# Patient Record
Sex: FEMALE | Race: WHITE | Employment: FULL TIME | URBAN - METROPOLITAN AREA
[De-identification: names, ages, dates, MRNs, and addresses within clinical notes are randomized per-mention and may not be internally consistent; named-entity substitution may affect disease eponyms.]

---

## 2020-09-22 ENCOUNTER — OFFICE VISIT (OUTPATIENT)
Dept: FAMILY MEDICINE CLINIC | Age: 50
End: 2020-09-22
Payer: COMMERCIAL

## 2020-09-22 VITALS
OXYGEN SATURATION: 99 % | DIASTOLIC BLOOD PRESSURE: 78 MMHG | RESPIRATION RATE: 16 BRPM | BODY MASS INDEX: 24.28 KG/M2 | HEART RATE: 63 BPM | SYSTOLIC BLOOD PRESSURE: 115 MMHG | TEMPERATURE: 97.2 F | WEIGHT: 142.2 LBS | HEIGHT: 64 IN

## 2020-09-22 DIAGNOSIS — N32.81 OVERACTIVE BLADDER: ICD-10-CM

## 2020-09-22 DIAGNOSIS — Z23 NEEDS FLU SHOT: ICD-10-CM

## 2020-09-22 DIAGNOSIS — Z13.1 SCREENING FOR DIABETES MELLITUS: ICD-10-CM

## 2020-09-22 DIAGNOSIS — Z13.6 SCREENING FOR CARDIOVASCULAR CONDITION: ICD-10-CM

## 2020-09-22 DIAGNOSIS — Z00.00 ANNUAL PHYSICAL EXAM: Primary | ICD-10-CM

## 2020-09-22 LAB
ALBUMIN SERPL-MCNC: 4.5 G/DL (ref 3.5–5)
ALBUMIN/GLOB SERPL: 1.4 {RATIO} (ref 1.1–2.2)
ALP SERPL-CCNC: 40 U/L (ref 45–117)
ALT SERPL-CCNC: 18 U/L (ref 12–78)
ANION GAP SERPL CALC-SCNC: 8 MMOL/L (ref 5–15)
APPEARANCE UR: CLEAR
AST SERPL-CCNC: 16 U/L (ref 15–37)
BASOPHILS # BLD: 0 K/UL (ref 0–0.1)
BASOPHILS NFR BLD: 1 % (ref 0–1)
BILIRUB SERPL-MCNC: 0.7 MG/DL (ref 0.2–1)
BILIRUB UR QL: NEGATIVE
BUN SERPL-MCNC: 12 MG/DL (ref 6–20)
BUN/CREAT SERPL: 14 (ref 12–20)
CALCIUM SERPL-MCNC: 9.4 MG/DL (ref 8.5–10.1)
CHLORIDE SERPL-SCNC: 109 MMOL/L (ref 97–108)
CHOLEST SERPL-MCNC: 217 MG/DL
CO2 SERPL-SCNC: 24 MMOL/L (ref 21–32)
COLOR UR: NORMAL
CREAT SERPL-MCNC: 0.86 MG/DL (ref 0.55–1.02)
DIFFERENTIAL METHOD BLD: ABNORMAL
EOSINOPHIL # BLD: 0.1 K/UL (ref 0–0.4)
EOSINOPHIL NFR BLD: 3 % (ref 0–7)
ERYTHROCYTE [DISTWIDTH] IN BLOOD BY AUTOMATED COUNT: 12.2 % (ref 11.5–14.5)
GLOBULIN SER CALC-MCNC: 3.2 G/DL (ref 2–4)
GLUCOSE SERPL-MCNC: 88 MG/DL (ref 65–100)
GLUCOSE UR STRIP.AUTO-MCNC: NEGATIVE MG/DL
HCT VFR BLD AUTO: 44.1 % (ref 35–47)
HDLC SERPL-MCNC: 82 MG/DL
HDLC SERPL: 2.6 {RATIO} (ref 0–5)
HGB BLD-MCNC: 14.2 G/DL (ref 11.5–16)
HGB UR QL STRIP: NEGATIVE
IMM GRANULOCYTES # BLD AUTO: 0 K/UL (ref 0–0.04)
IMM GRANULOCYTES NFR BLD AUTO: 1 % (ref 0–0.5)
KETONES UR QL STRIP.AUTO: NEGATIVE MG/DL
LDLC SERPL CALC-MCNC: 116.2 MG/DL (ref 0–100)
LEUKOCYTE ESTERASE UR QL STRIP.AUTO: NEGATIVE
LIPID PROFILE,FLP: ABNORMAL
LYMPHOCYTES # BLD: 1.1 K/UL (ref 0.8–3.5)
LYMPHOCYTES NFR BLD: 27 % (ref 12–49)
MCH RBC QN AUTO: 32.1 PG (ref 26–34)
MCHC RBC AUTO-ENTMCNC: 32.2 G/DL (ref 30–36.5)
MCV RBC AUTO: 99.8 FL (ref 80–99)
MONOCYTES # BLD: 0.4 K/UL (ref 0–1)
MONOCYTES NFR BLD: 9 % (ref 5–13)
NEUTS SEG # BLD: 2.4 K/UL (ref 1.8–8)
NEUTS SEG NFR BLD: 59 % (ref 32–75)
NITRITE UR QL STRIP.AUTO: NEGATIVE
NRBC # BLD: 0 K/UL (ref 0–0.01)
NRBC BLD-RTO: 0 PER 100 WBC
PH UR STRIP: 7 [PH] (ref 5–8)
PLATELET # BLD AUTO: 289 K/UL (ref 150–400)
PMV BLD AUTO: 10.5 FL (ref 8.9–12.9)
POTASSIUM SERPL-SCNC: 4.2 MMOL/L (ref 3.5–5.1)
PROT SERPL-MCNC: 7.7 G/DL (ref 6.4–8.2)
PROT UR STRIP-MCNC: NEGATIVE MG/DL
RBC # BLD AUTO: 4.42 M/UL (ref 3.8–5.2)
SODIUM SERPL-SCNC: 141 MMOL/L (ref 136–145)
SP GR UR REFRACTOMETRY: 1 (ref 1–1.03)
TRIGL SERPL-MCNC: 94 MG/DL (ref ?–150)
UROBILINOGEN UR QL STRIP.AUTO: 0.2 EU/DL (ref 0.2–1)
VLDLC SERPL CALC-MCNC: 18.8 MG/DL
WBC # BLD AUTO: 4.1 K/UL (ref 3.6–11)

## 2020-09-22 PROCEDURE — 99396 PREV VISIT EST AGE 40-64: CPT | Performed by: FAMILY MEDICINE

## 2020-09-22 PROCEDURE — 90686 IIV4 VACC NO PRSV 0.5 ML IM: CPT | Performed by: FAMILY MEDICINE

## 2020-09-22 PROCEDURE — 90471 IMMUNIZATION ADMIN: CPT | Performed by: FAMILY MEDICINE

## 2020-09-22 RX ORDER — OXYBUTYNIN CHLORIDE 10 MG/1
10 TABLET, EXTENDED RELEASE ORAL DAILY
Qty: 30 TAB | Refills: 1 | Status: SHIPPED | OUTPATIENT
Start: 2020-09-22 | End: 2022-02-23

## 2020-09-22 NOTE — PROGRESS NOTES
Patient stated name &     Chief Complaint   Patient presents with    Complete Physical     No PAP        Health Maintenance Due   Topic    DTaP/Tdap/Td series (1 - Tdap)    PAP AKA CERVICAL CYTOLOGY     Lipid Screen     Flu Vaccine (1)       Wt Readings from Last 3 Encounters:   20 142 lb 3.2 oz (64.5 kg)     Temp Readings from Last 3 Encounters:   20 97.2 °F (36.2 °C) (Oral)     BP Readings from Last 3 Encounters:   20 115/78     Pulse Readings from Last 3 Encounters:   20 63         Learning Assessment:  :     No flowsheet data found. Depression Screening:  :     3 most recent PHQ Screens 2020   Little interest or pleasure in doing things Not at all   Feeling down, depressed, irritable, or hopeless Not at all   Total Score PHQ 2 0       Fall Risk Assessment:  :     No flowsheet data found. Abuse Screening:  :     No flowsheet data found. Coordination of Care Questionnaire:  :     1) Have you been to an emergency room, urgent care clinic since your last visit? No    Hospitalized since your last visit? No             2) Have you seen or consulted any other health care providers outside of [address] [place] [address] since your last visit? No  (Include any pap smears or colon screenings in this section.)    Patient is accompanied by self I have received verbal consent from Blayne Snell to discuss any/all medical information while they are present in the room.

## 2020-09-22 NOTE — PATIENT INSTRUCTIONS

## 2020-09-22 NOTE — PROGRESS NOTES
Mirna Hart is a 52 y.o. female who presents today for her annual checkup    LMP: Unknown. Menses: Regular. G*P*. Last pelvic/PAP: UTD, done at [facility] [place]. Last mammogram: 2019. Last BMD: Never. Last screening colonoscopy: Never, patient made appointment for early colonoscopy. Trying to eat a well balanced diet. Immunization History   Administered Date(s) Administered    Influenza Vaccine (Quad) PF 09/22/2020   , There is no immunization history for the selected administration types on file for this patient. ,       Current Outpatient Medications   Medication Sig Dispense Refill    oxybutynin chloride XL (DITROPAN XL) 10 mg CR tablet Take 1 Tab by mouth daily. 30 Tab 1     Allergies: Patient has no known allergies.    Social History     Socioeconomic History    Marital status: Not on file     Spouse name: Not on file    Number of children: Not on file    Years of education: Not on file    Highest education level: Not on file   Occupational History    Not on file   Social Needs    Financial resource strain: Not on file    Food insecurity     Worry: Not on file     Inability: Not on file    Transportation needs     Medical: Not on file     Non-medical: Not on file   Tobacco Use    Smoking status: Never Smoker    Smokeless tobacco: Never Used   Substance and Sexual Activity    Alcohol use: Never     Frequency: Never    Drug use: Not on file    Sexual activity: Yes   Lifestyle    Physical activity     Days per week: Not on file     Minutes per session: Not on file    Stress: Not on file   Relationships    Social connections     Talks on phone: Not on file     Gets together: Not on file     Attends [employer] witness service: Not on file     Active member of club or organization: Not on file     Attends meetings of clubs or organizations: Not on file     Relationship status: Not on file    Intimate partner violence     Fear of current or ex partner: Not on file     Emotionally abused: Not on file     Physically abused: Not on file     Forced sexual activity: Not on file   Other Topics Concern    Not on file   Social History Narrative    Not on file     Family History   Problem Relation Age of Onset    Hypertension Father     Arthritis-osteo Father      Past Medical History:   Diagnosis Date    Bell's palsy          Review of Systems   Constitutional: Negative. HENT: Negative. Eyes: Negative. Respiratory: Negative. Cardiovascular: Negative. Gastrointestinal: Negative. Genitourinary: Positive for frequency and urgency. Musculoskeletal: Positive for back pain, joint pain and myalgias. Skin: Negative. Neurological: Negative. Endo/Heme/Allergies: Negative. Psychiatric/Behavioral: Negative. Physical Exam  Vitals signs and nursing note reviewed. HENT:      Head: Normocephalic and atraumatic. Right Ear: External ear normal.      Left Ear: External ear normal.      Nose: Nose normal.      Mouth/Throat:      Pharynx: No oropharyngeal exudate. Eyes:      Conjunctiva/sclera: Conjunctivae normal.   Neck:      Musculoskeletal: Normal range of motion and neck supple. Thyroid: No thyromegaly. Cardiovascular:      Rate and Rhythm: Normal rate and regular rhythm. Pulmonary:      Effort: Pulmonary effort is normal.      Breath sounds: Normal breath sounds. Abdominal:      General: Bowel sounds are normal. There is no distension. Palpations: Abdomen is soft. Tenderness: There is no abdominal tenderness. Musculoskeletal: Normal range of motion. Lymphadenopathy:      Cervical: No cervical adenopathy. Skin:     General: Skin is warm and dry. Neurological:      Mental Status: She is alert and oriented to person, place, and time. Psychiatric:         Mood and Affect: Mood and affect normal.            Assessment/plan    1. Annual physical exam    - LIPID PANEL; Future  - URINALYSIS W/ RFLX MICROSCOPIC;  Future  - CBC WITH AUTOMATED DIFF; Future  - METABOLIC PANEL, COMPREHENSIVE; Future  - THYROID CASCADE PROFILE; Future  - THYROID CASCADE PROFILE  - METABOLIC PANEL, COMPREHENSIVE  - CBC WITH AUTOMATED DIFF  - URINALYSIS W/ RFLX MICROSCOPIC  - LIPID PANEL    2. Needs flu shot    - INFLUENZA VIRUS VAC QUAD,SPLIT,PRESV FREE SYRINGE IM    3. Screening for cardiovascular condition    - LIPID PANEL; Future  - LIPID PANEL    4. Screening for diabetes mellitus    - URINALYSIS W/ RFLX MICROSCOPIC; Future  - METABOLIC PANEL, COMPREHENSIVE; Future  - METABOLIC PANEL, COMPREHENSIVE  - URINALYSIS W/ RFLX MICROSCOPIC    5. Overactive bladder    - URINALYSIS W/ RFLX MICROSCOPIC; Future  - THYROID CASCADE PROFILE; Future  - oxybutynin chloride XL (DITROPAN XL) 10 mg CR tablet; Take 1 Tab by mouth daily. Dispense: 30 Tab; Refill: 1  - THYROID CASCADE PROFILE  - URINALYSIS W/ RFLX MICROSCOPIC        52 y.o. female who presents today for annual exam. UTD on screening. UTD on vaccines. Will check routine labs. Encouraged daily exercise and trying to eat a well balanced diet. I have discussed the diagnosis with the patient and the intended plan as seen in the above orders. The patient has received an after-visit summary and questions were answered concerning future plans. I have discussed medication side effects and warnings with the patient as well. The patient agrees and understands above plan. Follow-up and Dispositions    · Return in about 1 year (around 9/22/2021) for follow up.            Adrian Valencia MD

## 2020-09-24 LAB — TSH SERPL-ACNC: 2.05 UIU/ML (ref 0.45–4.5)

## 2022-02-23 ENCOUNTER — OFFICE VISIT (OUTPATIENT)
Dept: FAMILY MEDICINE CLINIC | Age: 52
End: 2022-02-23
Payer: COMMERCIAL

## 2022-02-23 VITALS
HEIGHT: 64 IN | OXYGEN SATURATION: 99 % | BODY MASS INDEX: 23.73 KG/M2 | WEIGHT: 139 LBS | SYSTOLIC BLOOD PRESSURE: 109 MMHG | TEMPERATURE: 98 F | HEART RATE: 68 BPM | RESPIRATION RATE: 16 BRPM | DIASTOLIC BLOOD PRESSURE: 76 MMHG

## 2022-02-23 DIAGNOSIS — Z00.00 ANNUAL PHYSICAL EXAM: Primary | ICD-10-CM

## 2022-02-23 LAB
ALBUMIN SERPL-MCNC: 4.3 G/DL (ref 3.5–5)
ALBUMIN/GLOB SERPL: 1.5 {RATIO} (ref 1.1–2.2)
ALP SERPL-CCNC: 37 U/L (ref 45–117)
ALT SERPL-CCNC: 19 U/L (ref 12–78)
ANION GAP SERPL CALC-SCNC: 2 MMOL/L (ref 5–15)
APPEARANCE UR: CLEAR
AST SERPL-CCNC: 14 U/L (ref 15–37)
BASOPHILS # BLD: 0 K/UL (ref 0–0.1)
BASOPHILS NFR BLD: 1 % (ref 0–1)
BILIRUB DIRECT SERPL-MCNC: 0.2 MG/DL (ref 0–0.2)
BILIRUB SERPL-MCNC: 0.7 MG/DL (ref 0.2–1)
BILIRUB UR QL: NEGATIVE
BUN SERPL-MCNC: 11 MG/DL (ref 6–20)
BUN/CREAT SERPL: 14 (ref 12–20)
CALCIUM SERPL-MCNC: 9.1 MG/DL (ref 8.5–10.1)
CHLORIDE SERPL-SCNC: 107 MMOL/L (ref 97–108)
CHOLEST SERPL-MCNC: 182 MG/DL
CO2 SERPL-SCNC: 29 MMOL/L (ref 21–32)
COLOR UR: NORMAL
CREAT SERPL-MCNC: 0.8 MG/DL (ref 0.55–1.02)
DIFFERENTIAL METHOD BLD: NORMAL
EOSINOPHIL # BLD: 0.1 K/UL (ref 0–0.4)
EOSINOPHIL NFR BLD: 4 % (ref 0–7)
ERYTHROCYTE [DISTWIDTH] IN BLOOD BY AUTOMATED COUNT: 12.3 % (ref 11.5–14.5)
EST. AVERAGE GLUCOSE BLD GHB EST-MCNC: 111 MG/DL
GLOBULIN SER CALC-MCNC: 2.8 G/DL (ref 2–4)
GLUCOSE SERPL-MCNC: 92 MG/DL (ref 65–100)
GLUCOSE UR STRIP.AUTO-MCNC: NEGATIVE MG/DL
HBA1C MFR BLD: 5.5 % (ref 4–5.6)
HCT VFR BLD AUTO: 40.3 % (ref 35–47)
HDLC SERPL-MCNC: 65 MG/DL
HDLC SERPL: 2.8 {RATIO} (ref 0–5)
HGB BLD-MCNC: 13 G/DL (ref 11.5–16)
HGB UR QL STRIP: NEGATIVE
IMM GRANULOCYTES # BLD AUTO: 0 K/UL (ref 0–0.04)
IMM GRANULOCYTES NFR BLD AUTO: 0 % (ref 0–0.5)
KETONES UR QL STRIP.AUTO: NEGATIVE MG/DL
LDLC SERPL CALC-MCNC: 98.4 MG/DL (ref 0–100)
LEUKOCYTE ESTERASE UR QL STRIP.AUTO: NEGATIVE
LYMPHOCYTES # BLD: 1 K/UL (ref 0.8–3.5)
LYMPHOCYTES NFR BLD: 25 % (ref 12–49)
MCH RBC QN AUTO: 31.8 PG (ref 26–34)
MCHC RBC AUTO-ENTMCNC: 32.3 G/DL (ref 30–36.5)
MCV RBC AUTO: 98.5 FL (ref 80–99)
MONOCYTES # BLD: 0.4 K/UL (ref 0–1)
MONOCYTES NFR BLD: 10 % (ref 5–13)
NEUTS SEG # BLD: 2.4 K/UL (ref 1.8–8)
NEUTS SEG NFR BLD: 60 % (ref 32–75)
NITRITE UR QL STRIP.AUTO: NEGATIVE
NRBC # BLD: 0 K/UL (ref 0–0.01)
NRBC BLD-RTO: 0 PER 100 WBC
PH UR STRIP: 6.5 [PH] (ref 5–8)
PLATELET # BLD AUTO: 281 K/UL (ref 150–400)
PMV BLD AUTO: 10.6 FL (ref 8.9–12.9)
POTASSIUM SERPL-SCNC: 4.3 MMOL/L (ref 3.5–5.1)
PROT SERPL-MCNC: 7.1 G/DL (ref 6.4–8.2)
PROT UR STRIP-MCNC: NEGATIVE MG/DL
RBC # BLD AUTO: 4.09 M/UL (ref 3.8–5.2)
SODIUM SERPL-SCNC: 138 MMOL/L (ref 136–145)
SP GR UR REFRACTOMETRY: 1.01 (ref 1–1.03)
TRIGL SERPL-MCNC: 93 MG/DL (ref ?–150)
TSH SERPL DL<=0.05 MIU/L-ACNC: 1.98 UIU/ML (ref 0.36–3.74)
UROBILINOGEN UR QL STRIP.AUTO: 0.2 EU/DL (ref 0.2–1)
VLDLC SERPL CALC-MCNC: 18.6 MG/DL
WBC # BLD AUTO: 3.9 K/UL (ref 3.6–11)

## 2022-02-23 PROCEDURE — 99396 PREV VISIT EST AGE 40-64: CPT | Performed by: FAMILY MEDICINE

## 2022-02-23 RX ORDER — LEVONORGESTREL 52 MG/1
1 INTRAUTERINE DEVICE INTRAUTERINE ONCE
COMMUNITY

## 2022-02-23 NOTE — PROGRESS NOTES
Identified pt with two pt identifiers(name and ). Reviewed record in preparation for visit and have obtained necessary documentation. No chief complaint on file. Health Maintenance Due   Topic    Hepatitis C Screening     Cervical cancer screen     Flu Vaccine (1)    Depression Screen     COVID-19 Vaccine (3 - Booster for [place] series)       Coordination of Care Questionnaire:  :   1) Have you been to an emergency room, urgent care, or hospitalized since your last visit? If yes, where when, and reason for visit? no    2. Have seen or consulted any other health care provider since your last visit? If yes, where when, and reason for visit?  no        Patient is accompanied by self I have received verbal consent from Ana Eason to discuss any/all medical information while they are present in the room.

## 2022-02-23 NOTE — PATIENT INSTRUCTIONS
Nature Made    Vitamin D3 5000 units per day  Stress B complex with zinc and Vitamin C    Skin cancer prevention    It's possible to prevent skin cancer. Current recommendations include avoiding sun in the peak hours of the day, wearing hats and long sleeves in the sun and using sun blocks regularly. Patient with sun damaged skin or previously skin cancer should get yearly skin exams. Also, there is very good evidence that Vitamin B3 in the form of Nicotinamide 500mg twice a day prevents non melanoma skin cancers and lowers the rate of those cancers 20-30% over time. Nicotinamide (which is the same as Niacinamide) is available on line and is relatively inexpensive. It is also found in many B complex vitamins such as NATURE MADE Stress B complex but at a lower dose. No prescription is necessary for Nicotinamide.

## 2022-02-23 NOTE — PROGRESS NOTES
Yaa Lin  54 y.o. female  1970  UBL:[id number]  [facility]  Progress Note     Assessment and Plan:    1. Annual physical exam  No new issues identified  - CBC WITH AUTOMATED DIFF; Future  - HEPATIC FUNCTION PANEL; Future  - LIPID PANEL; Future  - METABOLIC PANEL, BASIC; Future  - HEMOGLOBIN A1C WITH EAG; Future  - TSH 3RD GENERATION; Future  - URINALYSIS W/ RFLX MICROSCOPIC; Future      Diet, exercise and safety discussed with patient. Diagnoses and plans were discussed with the patient. After visit summary given to the patient as well. Geovanny Gtz MD    Yaa Lin is a 54 y.o. female who had no chief complaint listed for this encounter. Health maintenance:      Immunizations:    Influenza: declined   Tetanus: up to date   Shingles: will defer to next year   Pneumovacc 21: not indicated    COVID vaccine: up to date    Cancer screening status   Colonoscopy: guidelines reviewed: up to date   PAP: guidelines reviewed: up to date   Mammogram: guidelines reviewed: up to date   Lung CT: guidelines reviewed: not indicated    Bone density screening: will do at age 74    Smoking: Non smoker      ETOH: modest use-less than 2 drinks per night/once a week    Drugs: Never user    Sexual history: Monogamous heterosexual    Eye exam and Glaucoma Screening: advised to have exam    Last dental exam: sees regularly    Hearing is good. [place] [employer]. The following sections were reviewed & updated as appropriate: PMH, PSH, FH, and SH. There is no problem list on file for this patient. Prior to Admission medications    Medication Sig Start Date End Date Taking? Authorizing Provider   levonorgestreL (Mirena) 20 mcg/24 hours (7 yrs) 52 mg IUD 1 Device by IntraUTERine route once. Yes Provider, Historical   oxybutynin chloride XL (DITROPAN XL) 10 mg CR tablet Take 1 Tab by mouth daily.   Patient not taking: Reported on 2/23/2022 9/22/20 2/23/22  Doyn MD Surekha          No Known Allergies      Smoker:   Social History     Tobacco Use   Smoking Status Never Smoker   Smokeless Tobacco Never Used     ETOH:   Social History     Substance and Sexual Activity   Alcohol Use Never       FH:    Family History   Problem Relation Age of Onset    Hypertension Father     OSTEOARTHRITIS Father        Review of Systems   Respiratory: Negative for shortness of breath. Cardiovascular: Negative for chest pain and leg swelling. Gastrointestinal: Negative for blood in stool. Genitourinary: Negative for hematuria. Psychiatric/Behavioral: Negative. Physical Exam:  Visit Vitals  /76 (BP 1 Location: Left upper arm, BP Patient Position: Sitting, BP Cuff Size: Adult)   Pulse 68   Temp 98 °F (36.7 °C) (Temporal)   Resp 16   Ht 5' 4\" (1.626 m)   Wt 139 lb (63 kg)   SpO2 99%   BMI 23.86 kg/m²       Physical Examination:  Physical Exam  General appearance - alert, well appearing, and in no distress, oriented to person, place, and time and normal appearing weight  Mental status -  normal mood, behavior, speech, dress, motor activity, and thought processes, no expressed suicidal or homicidal ideation, no hallucinations  Ears - bilateral TM's and external ear canals normal  Nose - normal and patent, no erythema, discharge or polyps  Mouth - mucous membranes moist, pharynx normal without lesions  Neck - supple, no significant adenopathy  Breast-sees GYN  Chest - normal chest excursion, normal inspiratory and expiratory function. Clear to ausculation bilaterally. Heart - normal rate, regular rhythm, normal S1, S2, no murmurs, rubs, clicks or gallops, no extremity edema  Abdomen- benign, no organomegaly or masses  GYN-sees GYN  Skin-no rashes or suspicious moles  Neuro normal speech, moves all extremities, normal gait  Musculoskeletal- grossly normal joint and motor function. d to person, place, and time and normal appearing weight